# Patient Record
Sex: MALE | Race: WHITE | NOT HISPANIC OR LATINO | URBAN - METROPOLITAN AREA
[De-identification: names, ages, dates, MRNs, and addresses within clinical notes are randomized per-mention and may not be internally consistent; named-entity substitution may affect disease eponyms.]

---

## 2017-08-21 NOTE — ASU PATIENT PROFILE, ADULT - PSH
Cataract of left eye  and right  Lipoma of left thigh  resection  Meningioma  resection  Mohs defect of right nose    S/P TURP (transurethral resection of prostate)    S/P wrist surgery  left  Status post bilateral hernia repair    Thyroid mass  removed

## 2017-08-21 NOTE — ASU PATIENT PROFILE, ADULT - PMH
Anxiety    Aortic calcification    Aortic regurgitation    Atrial fibrillation    Basal cell adenoma  right nose  Bladder cancer    BPH (benign prostatic hyperplasia)    CAD (coronary artery disease)    Carotid artery disease    CVA (cerebral vascular accident)  1913  DVT (deep venous thrombosis)  right shoulder 1982  Glaucoma    Hematuria    Hyperlipidemia    Meningioma    Mitral regurgitation Alkalosis  spondalosis  Anxiety    Aortic calcification    Aortic regurgitation    Atrial fibrillation    Basal cell adenoma  right nose  Bladder cancer    BPH (benign prostatic hyperplasia)    CAD (coronary artery disease)    Carotid artery disease    CVA (cerebral vascular accident)  1913  DVT (deep venous thrombosis)  right shoulder 1982  Glaucoma    Hematuria    Hyperlipidemia    Meningioma    Mitral regurgitation Alkalosis  spondalosis  Anxiety    Aortic calcification    Aortic regurgitation    Atrial fibrillation    Basal cell adenoma  right nose  Bladder cancer    BPH (benign prostatic hyperplasia)    CAD (coronary artery disease)    Carotid artery disease    CVA (cerebral vascular accident)  1913  DVT (deep venous thrombosis)  right shoulder 1982  Glaucoma    Glaucoma  bilateral  Hematuria    Hyperlipidemia    Meningioma    Mitral regurgitation

## 2017-08-22 ENCOUNTER — OUTPATIENT (OUTPATIENT)
Dept: OUTPATIENT SERVICES | Facility: HOSPITAL | Age: 77
LOS: 1 days | Discharge: HOME | End: 2017-08-22
Payer: MEDICARE

## 2017-08-22 VITALS
DIASTOLIC BLOOD PRESSURE: 72 MMHG | HEART RATE: 56 BPM | SYSTOLIC BLOOD PRESSURE: 152 MMHG | RESPIRATION RATE: 13 BRPM | TEMPERATURE: 98 F | OXYGEN SATURATION: 96 %

## 2017-08-22 VITALS
DIASTOLIC BLOOD PRESSURE: 72 MMHG | HEART RATE: 64 BPM | HEIGHT: 70 IN | RESPIRATION RATE: 16 BRPM | OXYGEN SATURATION: 97 % | SYSTOLIC BLOOD PRESSURE: 143 MMHG | WEIGHT: 165.79 LBS | TEMPERATURE: 98 F

## 2017-08-22 DIAGNOSIS — Z98.890 OTHER SPECIFIED POSTPROCEDURAL STATES: Chronic | ICD-10-CM

## 2017-08-22 DIAGNOSIS — D17.24 BENIGN LIPOMATOUS NEOPLASM OF SKIN AND SUBCUTANEOUS TISSUE OF LEFT LEG: Chronic | ICD-10-CM

## 2017-08-22 DIAGNOSIS — H26.9 UNSPECIFIED CATARACT: Chronic | ICD-10-CM

## 2017-08-22 DIAGNOSIS — M95.0 ACQUIRED DEFORMITY OF NOSE: Chronic | ICD-10-CM

## 2017-08-22 DIAGNOSIS — Z90.79 ACQUIRED ABSENCE OF OTHER GENITAL ORGAN(S): Chronic | ICD-10-CM

## 2017-08-22 DIAGNOSIS — D32.9 BENIGN NEOPLASM OF MENINGES, UNSPECIFIED: Chronic | ICD-10-CM

## 2017-08-22 DIAGNOSIS — E07.9 DISORDER OF THYROID, UNSPECIFIED: Chronic | ICD-10-CM

## 2017-08-22 PROCEDURE — 88305 TISSUE EXAM BY PATHOLOGIST: CPT

## 2017-08-22 PROCEDURE — 88112 CYTOPATH CELL ENHANCE TECH: CPT

## 2017-08-22 PROCEDURE — 52234 CYSTOSCOPY AND TREATMENT: CPT

## 2017-08-22 RX ORDER — DOCUSATE SODIUM 100 MG
1 CAPSULE ORAL
Qty: 14 | Refills: 0 | OUTPATIENT
Start: 2017-08-22 | End: 2017-08-29

## 2017-08-22 RX ORDER — ALPRAZOLAM 0.25 MG
1 TABLET ORAL
Qty: 5 | Refills: 0 | OUTPATIENT
Start: 2017-08-22

## 2017-08-22 RX ORDER — MORPHINE SULFATE 50 MG/1
4 CAPSULE, EXTENDED RELEASE ORAL EVERY 4 HOURS
Qty: 0 | Refills: 0 | Status: DISCONTINUED | OUTPATIENT
Start: 2017-08-22 | End: 2017-08-22

## 2017-08-22 RX ORDER — OXYCODONE AND ACETAMINOPHEN 5; 325 MG/1; MG/1
1 TABLET ORAL EVERY 4 HOURS
Qty: 0 | Refills: 0 | Status: DISCONTINUED | OUTPATIENT
Start: 2017-08-22 | End: 2017-08-22

## 2017-08-22 RX ORDER — ACETAMINOPHEN 500 MG
650 TABLET ORAL EVERY 6 HOURS
Qty: 0 | Refills: 0 | Status: DISCONTINUED | OUTPATIENT
Start: 2017-08-22 | End: 2017-08-22

## 2017-08-22 RX ORDER — OXYBUTYNIN CHLORIDE 5 MG
5 TABLET ORAL EVERY 8 HOURS
Qty: 0 | Refills: 0 | Status: DISCONTINUED | OUTPATIENT
Start: 2017-08-22 | End: 2017-08-22

## 2017-08-22 RX ORDER — ONDANSETRON 8 MG/1
4 TABLET, FILM COATED ORAL EVERY 6 HOURS
Qty: 0 | Refills: 0 | Status: DISCONTINUED | OUTPATIENT
Start: 2017-08-22 | End: 2017-08-22

## 2017-08-22 RX ORDER — SODIUM CHLORIDE 9 MG/ML
1000 INJECTION, SOLUTION INTRAVENOUS
Qty: 0 | Refills: 0 | Status: DISCONTINUED | OUTPATIENT
Start: 2017-08-22 | End: 2017-08-22

## 2017-08-22 RX ADMIN — OXYCODONE AND ACETAMINOPHEN 1 TABLET(S): 5; 325 TABLET ORAL at 09:12

## 2017-08-22 NOTE — PACU DISCHARGE NOTE - COMMENTS
VSS denies pain or discomfort. educated on dinh care. pt and spouse verbalize understanding. able to succesfully return demonstrate procedure.  educated on medications safety and follow up. discharged home stable. pt left unit accompanied by spouse

## 2017-08-23 LAB — SURGICAL PATHOLOGY STUDY: SIGNIFICANT CHANGE UP

## 2017-08-28 DIAGNOSIS — Z86.73 PERSONAL HISTORY OF TRANSIENT ISCHEMIC ATTACK (TIA), AND CEREBRAL INFARCTION WITHOUT RESIDUAL DEFICITS: ICD-10-CM

## 2017-08-28 DIAGNOSIS — Z79.01 LONG TERM (CURRENT) USE OF ANTICOAGULANTS: ICD-10-CM

## 2017-08-28 DIAGNOSIS — C67.4 MALIGNANT NEOPLASM OF POSTERIOR WALL OF BLADDER: ICD-10-CM
